# Patient Record
Sex: MALE | Race: WHITE | NOT HISPANIC OR LATINO | Employment: OTHER | URBAN - METROPOLITAN AREA
[De-identification: names, ages, dates, MRNs, and addresses within clinical notes are randomized per-mention and may not be internally consistent; named-entity substitution may affect disease eponyms.]

---

## 2017-03-10 PROBLEM — I34.0 MITRAL VALVE INSUFFICIENCY: Status: ACTIVE | Noted: 2017-03-10

## 2017-03-28 PROBLEM — E11.69 HYPERLIPIDEMIA ASSOCIATED WITH TYPE 2 DIABETES MELLITUS: Chronic | Status: ACTIVE | Noted: 2017-03-28

## 2017-03-28 PROBLEM — E78.5 HYPERLIPIDEMIA ASSOCIATED WITH TYPE 2 DIABETES MELLITUS: Chronic | Status: ACTIVE | Noted: 2017-03-28

## 2017-06-27 PROBLEM — R12 HEARTBURN: Status: ACTIVE | Noted: 2017-06-27

## 2018-03-02 PROBLEM — Z45.010 PACEMAKER BATTERY DEPLETION: Status: ACTIVE | Noted: 2018-03-02

## 2018-03-12 PROBLEM — E11.69 HYPERLIPIDEMIA ASSOCIATED WITH TYPE 2 DIABETES MELLITUS: Chronic | Status: RESOLVED | Noted: 2017-03-28 | Resolved: 2018-03-12

## 2018-03-12 PROBLEM — Z45.010 PACEMAKER BATTERY DEPLETION: Status: RESOLVED | Noted: 2018-03-02 | Resolved: 2018-03-12

## 2018-03-12 PROBLEM — E78.5 HYPERLIPIDEMIA ASSOCIATED WITH TYPE 2 DIABETES MELLITUS: Chronic | Status: RESOLVED | Noted: 2017-03-28 | Resolved: 2018-03-12

## 2018-03-12 PROBLEM — K21.9 GERD (GASTROESOPHAGEAL REFLUX DISEASE): Status: ACTIVE | Noted: 2018-03-12

## 2018-03-14 PROBLEM — Z45.010 PACEMAKER BATTERY DEPLETION: Status: ACTIVE | Noted: 2018-03-14

## 2018-03-14 PROBLEM — Z95.0 S/P CARDIAC PACEMAKER PROCEDURE: Status: ACTIVE | Noted: 2018-03-14

## 2018-06-07 ENCOUNTER — TELEPHONE (OUTPATIENT)
Dept: ADMINISTRATIVE | Facility: HOSPITAL | Age: 73
End: 2018-06-07

## 2018-08-13 PROBLEM — E78.2 MIXED HYPERLIPIDEMIA DUE TO TYPE 2 DIABETES MELLITUS: Chronic | Status: ACTIVE | Noted: 2018-08-13

## 2018-08-13 PROBLEM — E11.69 MIXED HYPERLIPIDEMIA DUE TO TYPE 2 DIABETES MELLITUS: Chronic | Status: ACTIVE | Noted: 2018-08-13

## 2019-02-11 ENCOUNTER — NURSE TRIAGE (OUTPATIENT)
Dept: ADMINISTRATIVE | Facility: CLINIC | Age: 74
End: 2019-02-11

## 2019-02-11 NOTE — TELEPHONE ENCOUNTER
Reason for Disposition   Thigh or calf pain in only one leg and present > 1 hour    Protocols used: ST LEG PAIN-A-[place]

## 2019-03-15 PROBLEM — Z45.010 PACEMAKER BATTERY DEPLETION: Status: RESOLVED | Noted: 2018-03-14 | Resolved: 2019-03-15

## 2019-09-12 PROBLEM — E11.69 MIXED HYPERLIPIDEMIA DUE TO TYPE 2 DIABETES MELLITUS: Chronic | Status: RESOLVED | Noted: 2018-08-13 | Resolved: 2019-09-12

## 2019-09-12 PROBLEM — Z95.0 S/P CARDIAC PACEMAKER PROCEDURE: Status: RESOLVED | Noted: 2018-03-14 | Resolved: 2019-09-12

## 2019-09-12 PROBLEM — E78.2 MIXED HYPERLIPIDEMIA DUE TO TYPE 2 DIABETES MELLITUS: Chronic | Status: RESOLVED | Noted: 2018-08-13 | Resolved: 2019-09-12

## 2019-09-12 PROBLEM — I07.1 TRICUSPID VALVE INSUFFICIENCY: Status: ACTIVE | Noted: 2019-09-12

## 2019-12-24 ENCOUNTER — NURSE TRIAGE (OUTPATIENT)
Dept: ADMINISTRATIVE | Facility: CLINIC | Age: 74
End: 2019-12-24

## 2019-12-24 NOTE — TELEPHONE ENCOUNTER
Reason for Disposition   Message left on unidentified voice mail.  Phone number verified.   Second attempt to contact family AND no contact made.  Phone number verified.    Additional Information   Negative: Caller is angry or rude (e.g., hangs up, verbally abusive, yelling)   Negative: Caller hangs up   Negative: Caller has already spoken with the PCP and has no further questions.   Negative: Caller has already spoken with another triager and has no further questions.   Negative: Caller has already spoken with another triager or PCP AND has further questions AND triager able to answer questions.    Protocols used: NO CONTACT OR DUPLICATE CONTACT CALL-A-